# Patient Record
Sex: MALE | Race: WHITE | Employment: PART TIME | URBAN - NONMETROPOLITAN AREA
[De-identification: names, ages, dates, MRNs, and addresses within clinical notes are randomized per-mention and may not be internally consistent; named-entity substitution may affect disease eponyms.]

---

## 2017-11-10 ENCOUNTER — HOSPITAL ENCOUNTER (INPATIENT)
Age: 42
LOS: 4 days | Discharge: HOME | DRG: 885 | End: 2017-11-14
Attending: PSYCHIATRY & NEUROLOGY | Admitting: PSYCHIATRY & NEUROLOGY
Payer: MEDICARE

## 2017-11-10 DIAGNOSIS — F20.9 SCHIZOPHRENIA, UNSPECIFIED TYPE (HCC): Primary | ICD-10-CM

## 2017-11-10 LAB
ACETAMINOPHEN LEVEL: < 5 UG/ML (ref 0–20)
ALBUMIN SERPL-MCNC: 4.4 G/DL (ref 3.5–5.1)
ALP BLD-CCNC: 130 U/L (ref 38–126)
ALT SERPL-CCNC: 27 U/L (ref 11–66)
AMPHETAMINE+METHAMPHETAMINE URINE SCREEN: NEGATIVE
ANION GAP SERPL CALCULATED.3IONS-SCNC: 15 MEQ/L (ref 8–16)
AST SERPL-CCNC: 24 U/L (ref 5–40)
BACTERIA: ABNORMAL /HPF
BARBITURATE QUANTITATIVE URINE: NEGATIVE
BASOPHILS # BLD: 0.5 %
BASOPHILS ABSOLUTE: 0 THOU/MM3 (ref 0–0.1)
BENZODIAZEPINE QUANTITATIVE URINE: NEGATIVE
BILIRUB SERPL-MCNC: 0.4 MG/DL (ref 0.3–1.2)
BILIRUBIN URINE: NEGATIVE
BLOOD, URINE: NEGATIVE
BUN BLDV-MCNC: 13 MG/DL (ref 7–22)
CALCIUM SERPL-MCNC: 9.2 MG/DL (ref 8.5–10.5)
CANNABINOID QUANTITATIVE URINE: NEGATIVE
CASTS 2: ABNORMAL /LPF
CASTS UA: ABNORMAL /LPF
CHARACTER, URINE: ABNORMAL
CHLORIDE BLD-SCNC: 103 MEQ/L (ref 98–111)
CO2: 23 MEQ/L (ref 23–33)
COCAINE METABOLITE QUANTITATIVE URINE: NEGATIVE
COLOR: YELLOW
CREAT SERPL-MCNC: 0.9 MG/DL (ref 0.4–1.2)
CRYSTALS, UA: ABNORMAL
EOSINOPHIL # BLD: 0.4 %
EOSINOPHILS ABSOLUTE: 0 THOU/MM3 (ref 0–0.4)
EPITHELIAL CELLS, UA: ABNORMAL /HPF
ETHYL ALCOHOL, SERUM: < 0.01 %
GFR SERPL CREATININE-BSD FRML MDRD: > 90 ML/MIN/1.73M2
GLUCOSE BLD-MCNC: 135 MG/DL (ref 70–108)
GLUCOSE URINE: NEGATIVE MG/DL
HCT VFR BLD CALC: 40.3 % (ref 42–52)
HEMOGLOBIN: 13.6 GM/DL (ref 14–18)
KETONES, URINE: NEGATIVE
LEUKOCYTE ESTERASE, URINE: NEGATIVE
LYMPHOCYTES # BLD: 23.9 %
LYMPHOCYTES ABSOLUTE: 1.8 THOU/MM3 (ref 1–4.8)
MCH RBC QN AUTO: 30.4 PG (ref 27–31)
MCHC RBC AUTO-ENTMCNC: 33.7 GM/DL (ref 33–37)
MCV RBC AUTO: 90.3 FL (ref 80–94)
MISCELLANEOUS 2: ABNORMAL
MONOCYTES # BLD: 5.1 %
MONOCYTES ABSOLUTE: 0.4 THOU/MM3 (ref 0.4–1.3)
NITRITE, URINE: NEGATIVE
NUCLEATED RED BLOOD CELLS: 0 /100 WBC
OPIATES, URINE: NEGATIVE
OSMOLALITY CALCULATION: 283.4 MOSMOL/KG (ref 275–300)
OXYCODONE: NEGATIVE
PDW BLD-RTO: 13.5 % (ref 11.5–14.5)
PH UA: 6.5
PHENCYCLIDINE QUANTITATIVE URINE: NEGATIVE
PLATELET # BLD: 124 THOU/MM3 (ref 130–400)
PMV BLD AUTO: 12.2 MCM (ref 7.4–10.4)
POTASSIUM SERPL-SCNC: 4 MEQ/L (ref 3.5–5.2)
PROTEIN UA: NEGATIVE
RBC # BLD: 4.46 MILL/MM3 (ref 4.7–6.1)
RBC URINE: ABNORMAL /HPF
RENAL EPITHELIAL, UA: ABNORMAL
SALICYLATE, SERUM: < 0.3 MG/DL (ref 2–10)
SEG NEUTROPHILS: 70.1 %
SEGMENTED NEUTROPHILS ABSOLUTE COUNT: 5.3 THOU/MM3 (ref 1.8–7.7)
SODIUM BLD-SCNC: 141 MEQ/L (ref 135–145)
SPECIFIC GRAVITY, URINE: 1.02 (ref 1–1.03)
TOTAL PROTEIN: 6.6 G/DL (ref 6.1–8)
TSH SERPL DL<=0.05 MIU/L-ACNC: 1.11 UIU/ML (ref 0.4–4.2)
UROBILINOGEN, URINE: 1 EU/DL
WBC # BLD: 7.5 THOU/MM3 (ref 4.8–10.8)
WBC UA: ABNORMAL /HPF
YEAST: ABNORMAL

## 2017-11-10 PROCEDURE — 81001 URINALYSIS AUTO W/SCOPE: CPT

## 2017-11-10 PROCEDURE — G0480 DRUG TEST DEF 1-7 CLASSES: HCPCS

## 2017-11-10 PROCEDURE — 1240000000 HC EMOTIONAL WELLNESS R&B

## 2017-11-10 PROCEDURE — 80050 GENERAL HEALTH PANEL: CPT

## 2017-11-10 PROCEDURE — 6370000000 HC RX 637 (ALT 250 FOR IP): Performed by: PSYCHIATRY & NEUROLOGY

## 2017-11-10 PROCEDURE — 99285 EMERGENCY DEPT VISIT HI MDM: CPT

## 2017-11-10 PROCEDURE — 6370000000 HC RX 637 (ALT 250 FOR IP): Performed by: NURSE PRACTITIONER

## 2017-11-10 PROCEDURE — 80307 DRUG TEST PRSMV CHEM ANLYZR: CPT

## 2017-11-10 PROCEDURE — 36415 COLL VENOUS BLD VENIPUNCTURE: CPT

## 2017-11-10 RX ORDER — HYDROXYZINE PAMOATE 50 MG/1
50 CAPSULE ORAL ONCE
Status: DISCONTINUED | OUTPATIENT
Start: 2017-11-10 | End: 2017-11-10

## 2017-11-10 RX ORDER — ACETAMINOPHEN 325 MG/1
650 TABLET ORAL EVERY 4 HOURS PRN
Status: DISCONTINUED | OUTPATIENT
Start: 2017-11-10 | End: 2017-11-14 | Stop reason: HOSPADM

## 2017-11-10 RX ORDER — HYDROXYZINE HYDROCHLORIDE 25 MG/1
50 TABLET, FILM COATED ORAL ONCE
Status: DISCONTINUED | OUTPATIENT
Start: 2017-11-10 | End: 2017-11-14 | Stop reason: HOSPADM

## 2017-11-10 RX ORDER — HYDROXYZINE PAMOATE 25 MG/1
25 CAPSULE ORAL 3 TIMES DAILY PRN
Status: DISCONTINUED | OUTPATIENT
Start: 2017-11-10 | End: 2017-11-14 | Stop reason: HOSPADM

## 2017-11-10 RX ORDER — TRAZODONE HYDROCHLORIDE 50 MG/1
50 TABLET ORAL NIGHTLY PRN
Status: DISCONTINUED | OUTPATIENT
Start: 2017-11-10 | End: 2017-11-14 | Stop reason: HOSPADM

## 2017-11-10 RX ORDER — MAGNESIUM HYDROXIDE/ALUMINUM HYDROXICE/SIMETHICONE 120; 1200; 1200 MG/30ML; MG/30ML; MG/30ML
30 SUSPENSION ORAL PRN
Status: DISCONTINUED | OUTPATIENT
Start: 2017-11-10 | End: 2017-11-14 | Stop reason: HOSPADM

## 2017-11-10 RX ORDER — RISPERIDONE 2 MG/1
2 TABLET, FILM COATED ORAL 2 TIMES DAILY
Status: DISCONTINUED | OUTPATIENT
Start: 2017-11-10 | End: 2017-11-12

## 2017-11-10 RX ADMIN — TRAZODONE HYDROCHLORIDE 50 MG: 50 TABLET ORAL at 21:43

## 2017-11-10 RX ADMIN — HYDROXYZINE PAMOATE 50 MG: 50 CAPSULE ORAL at 17:35

## 2017-11-10 RX ADMIN — RISPERIDONE 2 MG: 2 TABLET ORAL at 22:44

## 2017-11-10 RX ADMIN — HYDROXYZINE PAMOATE 25 MG: 25 CAPSULE ORAL at 21:43

## 2017-11-10 ASSESSMENT — ENCOUNTER SYMPTOMS
BACK PAIN: 0
COUGH: 0
CONSTIPATION: 0
SINUS PRESSURE: 0
COLOR CHANGE: 0
VOICE CHANGE: 0
VOMITING: 0
SORE THROAT: 0
ABDOMINAL DISTENTION: 0
ABDOMINAL PAIN: 0
WHEEZING: 0
DIARRHEA: 0
SHORTNESS OF BREATH: 0
CHEST TIGHTNESS: 0
NAUSEA: 0
PHOTOPHOBIA: 0
RHINORRHEA: 0
EYE REDNESS: 0
BLOOD IN STOOL: 0

## 2017-11-10 ASSESSMENT — SLEEP AND FATIGUE QUESTIONNAIRES
DO YOU HAVE DIFFICULTY SLEEPING: YES
DIFFICULTY ARISING: NO
DO YOU USE A SLEEP AID: NO
DIFFICULTY FALLING ASLEEP: NO
AVERAGE NUMBER OF SLEEP HOURS: 7
SLEEP PATTERN: DISTURBED/INTERRUPTED SLEEP
DIFFICULTY STAYING ASLEEP: YES
RESTFUL SLEEP: NO

## 2017-11-10 ASSESSMENT — LIFESTYLE VARIABLES: HISTORY_ALCOHOL_USE: NO

## 2017-11-10 ASSESSMENT — PATIENT HEALTH QUESTIONNAIRE - PHQ9: SUM OF ALL RESPONSES TO PHQ QUESTIONS 1-9: 11
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
## 2017-11-10 NOTE — ED PROVIDER NOTES
problem, joint swelling, neck pain and neck stiffness. Skin: Negative for color change and rash. Allergic/Immunologic: Negative for immunocompromised state. Neurological: Negative for dizziness, tremors, weakness, light-headedness, numbness and headaches. Hematological: Does not bruise/bleed easily. Psychiatric/Behavioral: Positive for hallucinations (Auditory). Negative for behavioral problems, confusion, decreased concentration, self-injury and suicidal ideas. The patient is not nervous/anxious. PAST MEDICAL HISTORY    has no past medical history on file. SURGICAL HISTORY      has no past surgical history on file. CURRENT MEDICATIONS       Previous Medications    BENZTROPINE MESYLATE PO    Take by mouth    RISPERIDONE (RISPERDAL PO)    Take by mouth       ALLERGIES     has No Known Allergies. FAMILY HISTORY     has no family status information on file. family history is not on file. SOCIAL HISTORY          PHYSICAL EXAM     INITIAL VITALS:  height is 6' 1\" (1.854 m) and weight is 195 lb (88.5 kg). His oral temperature is 97.6 °F (36.4 °C). His blood pressure is 125/75 and his pulse is 55. His respiration is 16 and oxygen saturation is 98%. Physical Exam   Constitutional: He is oriented to person, place, and time. He appears well-developed and well-nourished. HENT:   Head: Normocephalic. Right Ear: External ear normal.   Left Ear: External ear normal.   Nose: Nose normal.   Mouth/Throat: Uvula is midline and oropharynx is clear and moist.   Eyes: Conjunctivae and EOM are normal. Pupils are equal, round, and reactive to light. Neck: Normal range of motion. Neck supple. Cardiovascular: Normal rate, regular rhythm, S1 normal, S2 normal, normal heart sounds and intact distal pulses. Pulmonary/Chest: Effort normal and breath sounds normal. No respiratory distress. He exhibits no tenderness. Abdominal: Soft.  Normal appearance and bowel sounds are normal. He exhibits no distension. There is no tenderness. Musculoskeletal: Normal range of motion. Neurological: He is alert and oriented to person, place, and time. Skin: Skin is warm and dry. No rash noted. No erythema. No pallor. Psychiatric: His behavior is normal. Judgment and thought content normal.   Nursing note and vitals reviewed. DIFFERENTIAL DIAGNOSIS:   Auditory hallucination, Schizophrenia, Anxiety, Depression. DIAGNOSTIC RESULTS     EKG: All EKG's are interpreted by the Emergency Department Physician who either signs or Co-signs this chart in the absence of a cardiologist.    None    RADIOLOGY: non-plain film images(s) such as CT, Ultrasound and MRI are read by the radiologist.  Plain radiographic images are visualized and preliminarily interpreted by the emergency physician unless otherwise stated below.   No orders to display         LABS:   Labs Reviewed   CBC WITH AUTO DIFFERENTIAL - Abnormal; Notable for the following:        Result Value    RBC 4.46 (*)     Hemoglobin 13.6 (*)     Hematocrit 40.3 (*)     Platelets 925 (*)     MPV 12.2 (*)     All other components within normal limits   COMPREHENSIVE METABOLIC PANEL - Abnormal; Notable for the following:     Glucose 135 (*)     Alkaline Phosphatase 130 (*)     All other components within normal limits   SALICYLATE LEVEL - Abnormal; Notable for the following:     Salicylate, Serum < 0.3 (*)     All other components within normal limits   URINE WITH REFLEXED MICRO - Abnormal; Notable for the following:     Character, Urine CLOUDY (*)     All other components within normal limits   TSH WITHOUT REFLEX   ETHANOL   URINE DRUG SCREEN   ACETAMINOPHEN LEVEL   ANION GAP   GLOMERULAR FILTRATION RATE, ESTIMATED   OSMOLALITY       EMERGENCY DEPARTMENT COURSE:   Vitals:    Vitals:    11/10/17 1456   BP: 125/75   Pulse: 55   Resp: 16   Temp: 97.6 °F (36.4 °C)   TempSrc: Oral   SpO2: 98%   Weight: 195 lb (88.5 kg)   Height: 6' 1\" (1.854 m)       MDM    Patient was assessed at bedside labs were ordered. Patient was not in any pain and denied needing any pain medication at this time. Labs were reassuring. I personally went back and reassessed the patient. Patient is diagnosed with schizophrenia. I feel that it is appropriate to admit the patient at this time and they are amenable. I discussed the patient's case with Dr. Kaley Quezada, reji evaluated the patient for psychiatric admission and agreed to admit the patient under his care. Medications   hydrOXYzine (ATARAX) tablet 50 mg (50 mg Oral Not Given 11/10/17 7535)       Patient was seen independently by myself. The patient's final impression and disposition and plan was determined by myself. CRITICAL CARE:   None    CONSULTS:  PREETI  Dr. Kaley Quezada - Will admit patient. PROCEDURES:  None    FINAL IMPRESSION      1. Schizophrenia, unspecified type [facility])          DISPOSITION/PLAN   Admitted to the psychiatric service by Dr. Kaley Quezada. PATIENT REFERRED TO:  No follow-up provider specified. DISCHARGE MEDICATIONS:  New Prescriptions    No medications on file       (Please note that portions of this note were completed with a voice recognition program.  Efforts were made to edit the dictations but occasionally words are mis-transcribed.)    Scribe:  Kristal Monge 11/10/17 3:16 PM Scribing for and in the presence of Tommy Mauricoi CNP. Signed by: Angelo Agarwal, 11/10/17 5:59 PM    Provider:  I personally performed the services described in the documentation, reviewed and edited the documentation which was dictated to the scribe in my presence, and it accurately records my words and actions.     Tommy Mauricio CNP 11/10/17 5:59 PM    [facility] GILBERTO Mauricio  11/10/17 6626

## 2017-11-11 PROCEDURE — 90792 PSYCH DIAG EVAL W/MED SRVCS: CPT | Performed by: PSYCHIATRY & NEUROLOGY

## 2017-11-11 PROCEDURE — 6370000000 HC RX 637 (ALT 250 FOR IP): Performed by: PSYCHIATRY & NEUROLOGY

## 2017-11-11 PROCEDURE — 1240000000 HC EMOTIONAL WELLNESS R&B

## 2017-11-11 RX ADMIN — HYDROXYZINE PAMOATE 25 MG: 25 CAPSULE ORAL at 21:10

## 2017-11-11 RX ADMIN — RISPERIDONE 2 MG: 2 TABLET ORAL at 21:10

## 2017-11-11 RX ADMIN — RISPERIDONE 2 MG: 2 TABLET ORAL at 09:06

## 2017-11-11 RX ADMIN — TRAZODONE HYDROCHLORIDE 50 MG: 50 TABLET ORAL at 21:10

## 2017-11-11 ASSESSMENT — SLEEP AND FATIGUE QUESTIONNAIRES
DIFFICULTY FALLING ASLEEP: NO
RESTFUL SLEEP: NO
DO YOU USE A SLEEP AID: NO
DO YOU HAVE DIFFICULTY SLEEPING: YES
DIFFICULTY ARISING: NO
DIFFICULTY STAYING ASLEEP: YES
AVERAGE NUMBER OF SLEEP HOURS: 7
SLEEP PATTERN: DISTURBED/INTERRUPTED SLEEP

## 2017-11-11 ASSESSMENT — PAIN SCALES - GENERAL: PAINLEVEL_OUTOF10: 0

## 2017-11-11 ASSESSMENT — LIFESTYLE VARIABLES: HISTORY_ALCOHOL_USE: NO

## 2017-11-11 NOTE — ED NOTES
Pt lying on floor. Calm. No signs of distress. Called 4e and informed awaiting ready bed. Will cont to monitor.       Alisson Ortiz RN  11/10/17 9836

## 2017-11-11 NOTE — ED NOTES
Pt pacing in room and common area. ER provider notified. Pt cooperative. Will cont to monitor.       Sydney Slaughter RN  11/10/17 8617

## 2017-11-11 NOTE — PLAN OF CARE
Problem: Altered Mood, Psychotic Behavior  Goal: LTG-Able to verbalize decrease in frequency and intensity of hallucinations  Outcome: Not Met This Shift  Voices are telling him to stay in control of himself, they are not telling him to hurt himself today  Goal: LTG-Able to verbalize reality based thinking  Outcome: Ongoing    Goal: LTG-Absence of self-harm  Outcome: Met This Shift  Patient is free from self harm at this time  Goal: STG-Medication therapy compliance  Outcome: Met This Shift  Patient is taking medications as prescribed    Problem: Anxiety:  Goal: Level of anxiety will decrease  Level of anxiety will decrease   Outcome: Ongoing  Patient appears to have some anxiety, taking medications as prescribed    Problem: Activity:  Goal: Sleeping patterns will improve  Sleeping patterns will improve   Outcome: Met This Shift  Patient slept 8 continuous hours last night, awake during meals and groups    Problem: Discharge Planning:  Goal: Discharged to appropriate level of care  Discharged to appropriate level of care   Outcome: Not Met This Shift  Discharge planners working with patient to achieve optimal discharge plan, specific to the needs of this patient. Problem: Falls - Risk of:  Goal: Will remain free from falls  Will remain free from falls   Outcome: Met This Shift  No falls were observed or reported so far this shift, gait steady when ambulating and wears non-skid slippers socks. Encouraged pt to wear shower shoes if in the shower. Remains on fall precautions. Problem: Altered Mood, Depressive Behavior  Goal: LTG-Able to verbalize acceptance of life and situations over which he or she has no control  Outcome: Not Met This Shift  Patient able to verbalize some acceptance of life and situations over which he has no control.   Goal: STG-Able to verbalize suicidal ideations  Outcome: Met This Shift  Pt denies SI at this time, voices are not telling him to kill himself at this time  Goal: STG-Able to verbalize support system  Outcome: Not Met This Shift  Patient states that he has no support in [facility] [place], hoping that he will have support in [place]  Goal: Participation in care planning  Outcome: Met This Shift  This patient participates in his care planning  Goal: LTG-Absence of self-harm  Outcome: Met This Shift  Patient is free from self harm at this time    Problem: Suicide risk  Goal: Provide patient with safe environment  Provide patient with safe environment   Outcome: Met This Shift      Comments: Care plan reviewed with patient.   Patient does not verbalize understanding of the plan of care and does contribute to goal setting

## 2017-11-12 PROCEDURE — 99231 SBSQ HOSP IP/OBS SF/LOW 25: CPT | Performed by: NURSE PRACTITIONER

## 2017-11-12 PROCEDURE — 6370000000 HC RX 637 (ALT 250 FOR IP): Performed by: PSYCHIATRY & NEUROLOGY

## 2017-11-12 PROCEDURE — 1240000000 HC EMOTIONAL WELLNESS R&B

## 2017-11-12 PROCEDURE — 99232 SBSQ HOSP IP/OBS MODERATE 35: CPT | Performed by: PSYCHIATRY & NEUROLOGY

## 2017-11-12 RX ORDER — BENZTROPINE MESYLATE 1 MG/1
0.5 TABLET ORAL 2 TIMES DAILY
Status: DISCONTINUED | OUTPATIENT
Start: 2017-11-12 | End: 2017-11-14 | Stop reason: HOSPADM

## 2017-11-12 RX ORDER — CITALOPRAM 20 MG/1
10 TABLET ORAL DAILY
Status: DISCONTINUED | OUTPATIENT
Start: 2017-11-12 | End: 2017-11-14 | Stop reason: HOSPADM

## 2017-11-12 RX ORDER — RISPERIDONE 3 MG/1
3 TABLET, FILM COATED ORAL 2 TIMES DAILY
Status: DISCONTINUED | OUTPATIENT
Start: 2017-11-12 | End: 2017-11-14 | Stop reason: HOSPADM

## 2017-11-12 RX ADMIN — BENZTROPINE MESYLATE 0.5 MG: 1 TABLET ORAL at 22:08

## 2017-11-12 RX ADMIN — RISPERIDONE 2 MG: 2 TABLET ORAL at 10:01

## 2017-11-12 RX ADMIN — CITALOPRAM 10 MG: 20 TABLET, FILM COATED ORAL at 22:08

## 2017-11-12 RX ADMIN — BENZTROPINE MESYLATE 0.5 MG: 1 TABLET ORAL at 15:48

## 2017-11-12 RX ADMIN — TRAZODONE HYDROCHLORIDE 50 MG: 50 TABLET ORAL at 22:09

## 2017-11-12 RX ADMIN — RISPERIDONE 3 MG: 3 TABLET ORAL at 22:09

## 2017-11-12 ASSESSMENT — PAIN SCALES - GENERAL
PAINLEVEL_OUTOF10: 0
PAINLEVEL_OUTOF10: 0

## 2017-11-12 NOTE — FLOWSHEET NOTE
11/12/17 1630   Encounter Summary   Services provided to: Patient   Referral/Consult From: Nurse   Support System Family members;Friends/neighbors   Continue Visiting Yes  (11/12)   Complexity of Encounter Moderate   Length of Encounter 1 hour   Spiritual/Episcopal   Type Spiritual struggle   Grief and Life Adjustment   Type Adjustment to illness   Assessment Approachable; Hopeful   Intervention Discussed relationship with God;Discussed belief system/Latter-day practices/ezequiel;Prayer   Outcome Expressed gratitude;Engaged in conversation   11/12/17-Patient was in his room when this staff approached. He was sitting on the end of his bed to talk. - Patient was alert and oriented but somewhat confused regarding the voices in his head. He stated he doesn't know why they continue to try and get him to make bad decisions about life. He assured me he is not a person who swears however words come from his mouth that he doesn't intend to say. He felt it was necessary for him to leave [place] and move to [place] however he stopped here instead. In his pursuit he wants to get away from the voices telling him to reject God and his ways. - Patient and this staff discussed how he plans to get himself better. He stated he knows medication will help him as it has done before. He just needs someone to help get him back in the right direction. He spoke of wanting to start a new life for himself. This staff pointed out without knowing of anyone in [place] and having a place to stay it may not be in his best interest right now. He felt maybe going back to [place] after getting his meds straightened up then he will be able to make a plan before moving to [place]. I concurred with him this sounded like a better plan. - Patient asked for prayer before going to sleep for a while. Offered continued support and encouragement.

## 2017-11-12 NOTE — PLAN OF CARE
Problem: Altered Mood, Psychotic Behavior  Goal: LTG-Able to verbalize decrease in frequency and intensity of hallucinations  Outcome: Met This Shift  Pt denies hallucinations  Goal: LTG-Able to verbalize reality based thinking  Outcome: Met This Shift  No delusions voiced, alert and oriented  Goal: LTG-Absence of self-harm  Outcome: Met This Shift  No self harm  Goal: STG-Medication therapy compliance  Outcome: Completed Date Met: 11/12/17  Pt is taking medications    Problem: Anxiety:  Goal: Level of anxiety will decrease  Level of anxiety will decrease   Outcome: Ongoing  Pt had vistaril for c/o anxiety    Problem:  Activity:  Goal: Sleeping patterns will improve  Sleeping patterns will improve   Outcome: Ongoing  Pt had trazodone for sleep    Problem: Discharge Planning:  Goal: Discharged to appropriate level of care  Discharged to appropriate level of care   Outcome: Ongoing  D/c planning is in progress    Problem: Falls - Risk of:  Goal: Will remain free from falls  Will remain free from falls   Outcome: Met This Shift  No falls    Problem: KNOWLEDGE DEFICIT,EDUCATION,DISCHARGE PLAN  Goal: Knowledge - personal safety  Outcome: Ongoing  Pt did not work on safety plan this shift    Problem: Altered Mood, Depressive Behavior  Goal: LTG-Able to verbalize and/or display a decrease in depressive symptoms  Outcome: Ongoing  Rates mood 9/10, in bed, tired, disheveled appearance, is hopeful, no peer interaction  Goal: STG-Able to verbalize suicidal ideations  Outcome: Met This Shift  Pt denies suicidal thoughts  Goal: STG-Able to verbalize support system  Outcome: Ongoing  Pt verbalizes God as his support  Goal: STG-Knowledge of positive coping patterns  Outcome: Completed Date Met: 11/12/17  Pt verbalized deep breathing, taking a walk as positive coping  Goal: Patient Specific Goal  Outcome: Ongoing  Pt states he did good but could do better at controlling his thoughts and words  Goal: LTG-Absence of

## 2017-11-12 NOTE — PLAN OF CARE
Problem: Social interaction  Goal: Increased social interaction  Outcome: Ongoing  Pt has attended 2/3 group therapy sessions offered thus far today. Pt has come in and out of group and has had flight of ideas and difficulty focusing to one task. Pt has not been seen interacting with peers outside of group therapy sessions. Pt will continue to be encouraged to attend group therapy sessions. Pt will continue to be encouraged to socialize with peers appropriately.  Pt will continue to progress toward social interaction goal.
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
## 2017-11-12 NOTE — PLAN OF CARE
up\".  Goal: Participation in care planning  Outcome: Ongoing  Pt is participating in care planning. Goal: LTG-Absence of self-harm  Outcome: Met This Shift  No self-harming behavior noted. Comments: Care plan reviewed with patient.   Patient does verbalize understanding of the plan of care and does contribute to goal setting

## 2017-11-13 PROCEDURE — 99231 SBSQ HOSP IP/OBS SF/LOW 25: CPT | Performed by: NURSE PRACTITIONER

## 2017-11-13 PROCEDURE — 6370000000 HC RX 637 (ALT 250 FOR IP): Performed by: PSYCHIATRY & NEUROLOGY

## 2017-11-13 PROCEDURE — 1240000000 HC EMOTIONAL WELLNESS R&B

## 2017-11-13 RX ADMIN — BENZTROPINE MESYLATE 0.5 MG: 1 TABLET ORAL at 09:15

## 2017-11-13 RX ADMIN — TRAZODONE HYDROCHLORIDE 50 MG: 50 TABLET ORAL at 20:31

## 2017-11-13 RX ADMIN — RISPERIDONE 3 MG: 3 TABLET ORAL at 20:31

## 2017-11-13 RX ADMIN — BENZTROPINE MESYLATE 0.5 MG: 1 TABLET ORAL at 21:15

## 2017-11-13 RX ADMIN — RISPERIDONE 3 MG: 3 TABLET ORAL at 09:15

## 2017-11-13 RX ADMIN — CITALOPRAM 10 MG: 20 TABLET, FILM COATED ORAL at 09:15

## 2017-11-13 NOTE — PLAN OF CARE
Shift  Patient denies suicidal thoughts this PM.  Goal: STG-Able to verbalize support system  Outcome: Not Met This Shift  Patient does not verbalize having a support system. Goal: Patient Specific Goal  Outcome: Met This Shift  Patient reports that he met his goal to straighten his room up. Goal: Participation in care planning  Outcome: Ongoing  Patient participates in care planning as evidenced by attending groups and participating in the unit program.  Goal: LTG-Absence of self-harm  Outcome: Met This Shift  Patient is free from self-harm this PM.    Comments: Care plan reviewed with patient. Patient does verbalize understanding of the plan of care and does contribute to goal setting.

## 2017-11-13 NOTE — FLOWSHEET NOTE
11/12/17 1945   Encounter Summary   Services provided to: Patient   Referral/Consult From: Multi-disciplinary team   Support System Family members   Continue Visiting Yes  (11/12)   Complexity of Encounter Moderate   Length of Encounter 45 minutes   Routine   Type Initial   Assessment Approachable   Intervention Explored feelings, thoughts, concerns; Discussed relationship with God   Outcome Expressed gratitude;Engaged in conversation   Spiritual/Sikhism   Type Spiritual support   Spiritual Support Group Note    Number of Participants in Group: 3                  Time: 7:45-8:15  Goal: Relief from isolation and loneliness             Maribeth Sharing             Self-understanding and gain insight              Acceptance and belonging            Recognize they are not alone                Socialization             Empowerment       Encouragement    Topic:  [x] Spiritual Wellness and Self Care                  [x] Hope                     [x] Connecting with Divine/Others        [] Thankfulness and Gratitude               [x]  Meaningfulness and Purpose               [] Forgiveness               [] Peace               [] Connect to [employer]     [] Other:    Participation Level:   [x] Active Listener   [] Minimal   [] Monopolizing   [x] Interactive   [] No Participation   []  Other:     Attention:   [x] Alert   [] Distractible   [] Drowsy   [] Poor   [] Other:    Manner:   [x] Cooperative   [] Suspicious   [] Withdrawn   [] Guarded   [] Irritable   [] Inhospitable   [] Other:     Others Comments from Group:     Patient was contributing to the conversation until one other member began talking about his suicide attempt. This patient then closed in his posture and became quiet. Within about 30 secs he got up and left the group without excusing himself or saying anything to this staff or other members.

## 2017-11-13 NOTE — PLAN OF CARE
Problem: Altered Mood, Psychotic Behavior  Goal: LTG-Able to verbalize decrease in frequency and intensity of hallucinations  Outcome: Ongoing    Goal: LTG-Able to verbalize reality based thinking  Outcome: Ongoing  Patient is able to verbalize reality based thinking. Goal: LTG-Absence of self-harm  Outcome: Ongoing  Absence of self-harm to patient noted this shift. Problem: Anxiety:  Goal: Level of anxiety will decrease  Level of anxiety will decrease   Outcome: Ongoing  Patient states anxiety is decreasing. Problem: Activity:  Goal: Sleeping patterns will improve  Sleeping patterns will improve   Outcome: Ongoing  Sleeping patterns are improving. Problem: Discharge Planning:  Goal: Discharged to appropriate level of care  Discharged to appropriate level of care   Outcome: Ongoing  Discharge planning is on going. Problem: Falls - Risk of:  Goal: Will remain free from falls  Will remain free from falls   Outcome: Ongoing  Patient did not fall this evening. Encouraged use of call light to get assistance. Problem: KNOWLEDGE DEFICIT,EDUCATION,DISCHARGE PLAN  Goal: Knowledge - personal safety  Outcome: Ongoing      Problem: Altered Mood, Depressive Behavior  Goal: LTG-Able to verbalize acceptance of life and situations over which he or she has no control  Outcome: Ongoing  Patient is able to verbalize acceptance of life and situations over which he or she has no control. Goal: LTG-Able to verbalize and/or display a decrease in depressive symptoms  Outcome: Ongoing    Goal: STG-Able to verbalize suicidal ideations  Outcome: Ongoing  Patient is able to deny suicidal ideations. Goal: STG-Able to verbalize support system  Outcome: Ongoing  Patient is   able to verbalize that they have support system.   Goal: Patient Specific Goal  Outcome: Ongoing  Patient reports continuing to work towards goal.  Goal: Participation in care planning  Outcome: Ongoing  Patient is able and willing to participate in care

## 2017-11-14 VITALS
RESPIRATION RATE: 18 BRPM | SYSTOLIC BLOOD PRESSURE: 123 MMHG | HEIGHT: 72 IN | TEMPERATURE: 97.4 F | BODY MASS INDEX: 26.28 KG/M2 | OXYGEN SATURATION: 97 % | WEIGHT: 194 LBS | DIASTOLIC BLOOD PRESSURE: 67 MMHG | HEART RATE: 60 BPM

## 2017-11-14 PROCEDURE — 99238 HOSP IP/OBS DSCHRG MGMT 30/<: CPT | Performed by: PSYCHIATRY & NEUROLOGY

## 2017-11-14 PROCEDURE — 5130000000 HC BRIDGE APPOINTMENT

## 2017-11-14 PROCEDURE — 6370000000 HC RX 637 (ALT 250 FOR IP): Performed by: PSYCHIATRY & NEUROLOGY

## 2017-11-14 RX ORDER — CITALOPRAM 10 MG/1
10 TABLET ORAL DAILY
Qty: 30 TABLET | Refills: 3 | Status: SHIPPED | OUTPATIENT
Start: 2017-11-15 | End: 2017-12-15

## 2017-11-14 RX ORDER — RISPERIDONE 3 MG/1
3 TABLET, FILM COATED ORAL 2 TIMES DAILY
Qty: 60 TABLET | Refills: 1 | Status: SHIPPED | OUTPATIENT
Start: 2017-11-14 | End: 2017-12-14

## 2017-11-14 RX ORDER — TRAZODONE HYDROCHLORIDE 50 MG/1
50 TABLET ORAL NIGHTLY PRN
Qty: 30 TABLET | Refills: 0 | Status: SHIPPED | OUTPATIENT
Start: 2017-11-14 | End: 2017-12-14

## 2017-11-14 RX ADMIN — RISPERIDONE 3 MG: 3 TABLET ORAL at 08:19

## 2017-11-14 RX ADMIN — CITALOPRAM 10 MG: 20 TABLET, FILM COATED ORAL at 08:20

## 2017-11-14 RX ADMIN — BENZTROPINE MESYLATE 0.5 MG: 1 TABLET ORAL at 08:19

## 2017-11-14 NOTE — PLAN OF CARE
Problem: Discharge Planning:  Goal: Discharged to appropriate level of care  Discharged to appropriate level of care   Outcome: Completed Date Met: 11/14/17  Jasper Galdamez has a follow up appointment scheduled with Amy Wang on 11/22/17 at 1:00 PM at [facility]
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
## 2017-11-14 NOTE — PLAN OF CARE
Problem: Altered Mood, Psychotic Behavior  Goal: LTG-Able to verbalize decrease in frequency and intensity of hallucinations  Outcome: Ongoing  Patient continues to report having auditory hallucinations. Voices are telling hime that \"You are about ready to give up\". Goal: LTG-Able to verbalize reality based thinking  Outcome: Completed Date Met: 11/14/17  Patient is alert and oriented to 4 spheres. Goal: LTG-Absence of self-harm  Outcome: Met This Shift  Patient is free from self-harm this PM.    Problem: Anxiety:  Goal: Level of anxiety will decrease  Level of anxiety will decrease   Outcome: Ongoing  Patient reports that he has a\"little \" anxiety. Problem: Activity:  Goal: Sleeping patterns will improve  Sleeping patterns will improve   Outcome: Met This Shift  Patient slept 8.5 hours continuous last night per report. Problem: Discharge Planning:  Goal: Discharged to appropriate level of care  Discharged to appropriate level of care   Outcome: Ongoing  Discharge planning is in process. Problem: Falls - Risk of:  Goal: Will remain free from falls  Will remain free from falls   Outcome: Met This Shift  Patient remains free from falls this PM. Patient's gait is steady when ambulating on the unit. Problem: KNOWLEDGE DEFICIT,EDUCATION,DISCHARGE PLAN  Goal: Knowledge - personal safety  Outcome: Ongoing  Patient is working toward establishing a safety plan for discharge. Problem: Altered Mood, Depressive Behavior  Goal: LTG-Able to verbalize acceptance of life and situations over which he or she has no control  Outcome: Ongoing  Patient verbalizes that he is working toward acceptance of life and the situations he has no control over. Goal: LTG-Able to verbalize and/or display a decrease in depressive symptoms  Outcome: Ongoing  Patient reports that he has a \"little\" depression. Patient rates his mood at a # 7.   Goal: STG-Able to verbalize suicidal ideations  Outcome: Met This Shift  Patient denies suicidal thoughts this PM.  Goal: STG-Able to verbalize support system  Outcome: Met This Shift  Patient reports that his  and his wife are supportive. Goal: Patient Specific Goal  Outcome: Met This Shift  Patient reports that he met his goal to talk to the Doctor. Goal: Participation in care planning  Outcome: Met This Shift  Patient participates in care planning as evidenced by attending groups and participating in the unit program.  Goal: LTG-Absence of self-harm  Outcome: Met This Shift  Patient is free from self-harm this PM.    Comments: Care plan reviewed with patient. Patient does verbalize understanding of the plan of care and does contribute to goal setting.

## 2017-11-22 NOTE — DISCHARGE SUMMARY
Home today  Follow up with his provider in [place]    Time spent:  20 minutes    Electronically signed by Army Victor. on 11/22/2017 at 7:57 AM

## 2023-10-23 ENCOUNTER — OUTSIDE FACILITY SERVICE (OUTPATIENT)
Dept: CARDIOLOGY | Facility: CLINIC | Age: 48
End: 2023-10-23
Payer: MEDICARE